# Patient Record
Sex: FEMALE | Race: WHITE | NOT HISPANIC OR LATINO | Employment: UNEMPLOYED | URBAN - METROPOLITAN AREA
[De-identification: names, ages, dates, MRNs, and addresses within clinical notes are randomized per-mention and may not be internally consistent; named-entity substitution may affect disease eponyms.]

---

## 2017-02-02 ENCOUNTER — ALLSCRIPTS OFFICE VISIT (OUTPATIENT)
Dept: OTHER | Facility: OTHER | Age: 32
End: 2017-02-02

## 2018-01-13 VITALS
WEIGHT: 153 LBS | HEIGHT: 61 IN | RESPIRATION RATE: 16 BRPM | TEMPERATURE: 98.4 F | DIASTOLIC BLOOD PRESSURE: 60 MMHG | SYSTOLIC BLOOD PRESSURE: 108 MMHG | BODY MASS INDEX: 28.89 KG/M2 | HEART RATE: 84 BPM

## 2018-01-14 NOTE — MISCELLANEOUS
Message  Return to work or school:   Chago Maloney is under my professional care  She was seen in my office on 1/25/16       Not to return to work, until fever free for 24 hours without antipyretics     Emil Sal MD       Signatures   Electronically signed by : CAROLINE Osman ; Jan 25 2016 12:52PM EST                       (Author)

## 2018-01-14 NOTE — PROGRESS NOTES
Assessment    1  Acute sinusitis (461 9) (J01 90)    Plan  Acute sinusitis    · Amoxicillin 500 MG Oral Tablet; 2po tid x 10 days   · Fluticasone Propionate 50 MCG/ACT Nasal Suspension; USE 1 SPRAY IN EACH  NOSTRIL ONCE DAILY    Discussion/Summary    #1 sinusitis  Discussed Dx, Tx - above meds  Discussed SE abx, such as diarrhea, C diff  Discussed probiotics, may lower risk of C diff  Continue 1wk after finished abx  Discussed use of nasal steroids, such as Flonase, Nasonex, Nasacort, Rhinocort, some now OTC  Advised to throw away, once started, not to keep them for the next episode, as bacteria can grow  Advised to review other SE on all meds, given from the pharmacy along c/ the prescription  Discussed ibuprofen/Tylenol for fever control, push fluids  Return if not better  prn  The patient was counseled regarding instructions for management  The treatment plan was reviewed with the patient/guardian  The patient/guardian understands and agrees with the treatment plan      Chief Complaint  Patient prersents with fever times 3 days  Patient is breast feeding 9 month old at home      History of Present Illness  HPI: 29y/o wf presents to office for fever x 3d's  Tmax 103F, did not take any meds today  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, one c/ bronchiolitis  Cold Symptoms:   Associated symptoms include nasal congestion, runny nose, dry cough, swollen lymph nodes, fatigue and fever, but no sore throat, no productive cough, no facial pressure, no facial pain, no plugged ear(s), no ear pain, no wheezing, no shortness of breath, no nausea, no vomiting and no diarrhea  Fever:   Associated symptoms include ear pain, cough and swollen neck glands  Active Problems    1  Anxiety (300 00) (F41 9)   2  Breast feeding status of mother (V24 1) (Z39 1)   3  Hyperlipidemia (272 4) (E78 5)   4  Need for prophylactic vaccination and inoculation against influenza (V04 81) (Z23)   5   Pain in wrist joint (719 43) (M25 539)   6  Pharyngitis (462) (J02 9)    Past Medical History    1  History of Acute maxillary sinusitis (461 0) (J01 00)   2  History of Attention Deficit Disorder Without Hyperactivity (314 00)   3  History of Cough (786 2) (R05)   4  History of Depression with anxiety (300 4) (F41 8)   5  History of Encounter for screening for respiratory tuberculosis (V74 1) (Z11 1)   6  History of headache (V13 89) (Z87 898)   7  History of ovarian cyst (V13 29) (Z87 42)   8  Need for prophylactic vaccination and inoculation against influenza (V04 81) (Z23)   9  History of Nonallopathic lesion of thoracic region (739 2) (M99 02)   10  History of Skin Abscess Of Arm (682 3)    Family History    1  Family history of Dyslipidemia   2  Family history of Hyperlipidemia   3  Family history of Hypertension (V17 49)    Social History    · Being A Social Drinker   · Marital History - Currently    · Never A Smoker    Current Meds   1  Escitalopram Oxalate 10 MG Oral Tablet; 1/2 tab po x3d then full tab every day; Therapy: 47FZC7848 to (Last Rx:16Jan2014)  Requested for: 21Jan2015 Ordered   2  Lidocaine Viscous 2 % Mouth/Throat Solution; USE 10ML EVERY 3 HOURS AS   NEEDED, swith and spit out; Therapy: 18QTK0462 to (Last Rx:13Fsz6430)  Requested for: 49ZTD6854 Ordered   3  Prenatal Vitamins TABS; Therapy: (Recorded:16Nov2015) to Recorded   4  Tylenol 325 MG Oral Tablet; TAKE 1 TO 2 TABLETS EVERY 4 HOURS AS NEEDED; Therapy: 71IKE8611 to (Evaluate:79Eub9122); Last Rx:59Sal0011 Ordered    Allergies    1  No Known Drug Allergies    Vitals   Recorded: 98MKC4777 10:51AM   Temperature 99 1 F   Heart Rate 84   Respiration 16   Systolic 337   Diastolic 84   Weight 900 lb    BMI Calculated 22 49   BSA Calculated 1 52     Physical Exam    Constitutional   General appearance: No acute distress, well appearing and well nourished      Head and Face   Palpation of the face and sinuses: Abnormal   Examination of the Sinuses: right maxillary tenderness, left maxillary tenderness, right frontal tenderness and left frontal tenderness, but non-tender right ethmoid and non-tender left ethmoid  Ears, Nose, Mouth, and Throat   Nasal mucosa, septum, and turbinates: Abnormal   There was clear rhinorrhea from both nares  The bilateral nasal mucosa was edematous and red, but not bleeding, not boggy, not crusted, not excoriated and not pale/blue  Oropharynx: Normal with no erythema, edema, exudate or lesions  Pulmonary   Respiratory effort: No increased work of breathing or signs of respiratory distress  Auscultation of lungs: Clear to auscultation  Cardiovascular   Auscultation of heart: Normal rate and rhythm, normal S1 and S2, no murmurs  Psychiatric   Judgment and insight: Normal     Orientation to person, place, and time: Normal     Recent and remote memory: Intact  Mood and affect: Normal        Message  Return to work or school:   Anthony Rowe is under my professional care  She was seen in my office on 1/25/16       Not to return to work, until fever free for 24 hours without antipyretics     Brina Mays MD       Signatures   Electronically signed by : CAROLINE Washburn ; Jan 25 2016 12:52PM EST                       (Author)

## 2018-03-29 ENCOUNTER — OFFICE VISIT (OUTPATIENT)
Dept: FAMILY MEDICINE CLINIC | Facility: CLINIC | Age: 33
End: 2018-03-29
Payer: COMMERCIAL

## 2018-03-29 VITALS
TEMPERATURE: 98.7 F | HEART RATE: 84 BPM | SYSTOLIC BLOOD PRESSURE: 120 MMHG | RESPIRATION RATE: 16 BRPM | DIASTOLIC BLOOD PRESSURE: 70 MMHG | WEIGHT: 129 LBS | BODY MASS INDEX: 23.74 KG/M2 | HEIGHT: 62 IN

## 2018-03-29 DIAGNOSIS — J02.9 VIRAL PHARYNGITIS: Primary | ICD-10-CM

## 2018-03-29 DIAGNOSIS — J02.9 SORE THROAT: ICD-10-CM

## 2018-03-29 LAB — S PYO AG THROAT QL: NEGATIVE

## 2018-03-29 PROCEDURE — 3008F BODY MASS INDEX DOCD: CPT | Performed by: NURSE PRACTITIONER

## 2018-03-29 PROCEDURE — 99213 OFFICE O/P EST LOW 20 MIN: CPT | Performed by: NURSE PRACTITIONER

## 2018-03-29 PROCEDURE — 87880 STREP A ASSAY W/OPTIC: CPT | Performed by: NURSE PRACTITIONER

## 2018-03-29 RX ORDER — ESCITALOPRAM OXALATE 10 MG/1
1 TABLET ORAL DAILY
COMMUNITY
Start: 2013-11-20 | End: 2018-04-18 | Stop reason: SDUPTHER

## 2018-03-29 RX ORDER — ACETAMINOPHEN AND CODEINE PHOSPHATE 120; 12 MG/5ML; MG/5ML
SOLUTION ORAL
COMMUNITY
Start: 2018-01-12 | End: 2020-03-04 | Stop reason: ALTCHOICE

## 2018-03-29 NOTE — PROGRESS NOTES
Subjective     Kenzie Molina is a 29 y o  female who presents for evaluation of sore throat  Associated symptoms include sore throat and headache  Onset of symptoms was 1 day ago, and have been gradually worsening since that time  She is drinking plenty of fluids  She has not had a recent close exposure to someone with proven streptococcal pharyngitis  The following portions of the patient's history were reviewed and updated as appropriate: allergies, current medications, past family history, past medical history, past social history, past surgical history and problem list     Review of Systems  Pertinent items are noted in HPI  Kenneth Rey No fever, no chills    No nasal congestion, cough, pnd, n/v/d   (+) headache  (+) sore throat  No sob  Objective   Constitutional: no fever, no distress, alert  HEENT: TMS WNL  Turbinates not inflamed  Oropharynx with mild erythema, no exudate  No sinus tenderness to palpation  (-) PND  Pulm: lungs clear, no wheeze, rhonchi, no increased resp effort  Cardiac: S1S2 rrr  Skin : warm and dry, no rash  Lymph: no adenopathy        Laboratory  Strep test done,   Results: negative    Assessment/Plan       1  Viral pharyngitis  - POCT rapid strepA  Rapid strep test was negative in the office  Salt water gargles  Lozenges  Tylenol or Ibuprofen as needed  Fluids

## 2018-04-18 DIAGNOSIS — F32.A ANXIETY AND DEPRESSION: Primary | ICD-10-CM

## 2018-04-18 DIAGNOSIS — F41.9 ANXIETY AND DEPRESSION: Primary | ICD-10-CM

## 2018-04-18 RX ORDER — ESCITALOPRAM OXALATE 10 MG/1
10 TABLET ORAL DAILY
Qty: 30 TABLET | Refills: 3 | Status: SHIPPED | OUTPATIENT
Start: 2018-04-18 | End: 2018-08-26 | Stop reason: SDUPTHER

## 2018-08-26 DIAGNOSIS — F32.A ANXIETY AND DEPRESSION: ICD-10-CM

## 2018-08-26 DIAGNOSIS — F41.9 ANXIETY AND DEPRESSION: ICD-10-CM

## 2018-08-26 RX ORDER — ESCITALOPRAM OXALATE 10 MG/1
10 TABLET ORAL DAILY
Qty: 30 TABLET | Refills: 3 | Status: SHIPPED | OUTPATIENT
Start: 2018-08-26 | End: 2018-10-17 | Stop reason: SDUPTHER

## 2018-08-27 NOTE — TELEPHONE ENCOUNTER
CALLED PT ADVISED SAME, PT SAID SHE WILL CALL BACK AND SCHED   APPT , SHE WASN'T PREPARED TO DO SO TODAY

## 2018-10-17 DIAGNOSIS — F41.9 ANXIETY AND DEPRESSION: ICD-10-CM

## 2018-10-17 DIAGNOSIS — F32.A ANXIETY AND DEPRESSION: ICD-10-CM

## 2018-10-18 RX ORDER — ESCITALOPRAM OXALATE 10 MG/1
10 TABLET ORAL DAILY
Qty: 30 TABLET | Refills: 0 | Status: SHIPPED | OUTPATIENT
Start: 2018-10-18 | End: 2018-12-30 | Stop reason: SDUPTHER

## 2018-12-30 DIAGNOSIS — F32.A ANXIETY AND DEPRESSION: ICD-10-CM

## 2018-12-30 DIAGNOSIS — F41.9 ANXIETY AND DEPRESSION: ICD-10-CM

## 2018-12-30 RX ORDER — ESCITALOPRAM OXALATE 10 MG/1
TABLET ORAL
Qty: 30 TABLET | Refills: 0 | Status: SHIPPED | OUTPATIENT
Start: 2018-12-30 | End: 2019-02-11 | Stop reason: SDUPTHER

## 2019-02-08 DIAGNOSIS — F32.A ANXIETY AND DEPRESSION: ICD-10-CM

## 2019-02-08 DIAGNOSIS — F41.9 ANXIETY AND DEPRESSION: ICD-10-CM

## 2019-02-08 RX ORDER — ESCITALOPRAM OXALATE 10 MG/1
TABLET ORAL
Qty: 30 TABLET | Refills: 0 | OUTPATIENT
Start: 2019-02-08

## 2019-02-11 ENCOUNTER — OFFICE VISIT (OUTPATIENT)
Dept: FAMILY MEDICINE CLINIC | Facility: CLINIC | Age: 34
End: 2019-02-11
Payer: COMMERCIAL

## 2019-02-11 VITALS
HEIGHT: 62 IN | TEMPERATURE: 98.2 F | WEIGHT: 132.2 LBS | RESPIRATION RATE: 14 BRPM | SYSTOLIC BLOOD PRESSURE: 90 MMHG | BODY MASS INDEX: 24.33 KG/M2 | HEART RATE: 58 BPM | DIASTOLIC BLOOD PRESSURE: 78 MMHG

## 2019-02-11 DIAGNOSIS — F32.A ANXIETY AND DEPRESSION: ICD-10-CM

## 2019-02-11 DIAGNOSIS — F41.9 ANXIETY AND DEPRESSION: ICD-10-CM

## 2019-02-11 DIAGNOSIS — Z00.00 PHYSICAL EXAM: ICD-10-CM

## 2019-02-11 PROCEDURE — 99213 OFFICE O/P EST LOW 20 MIN: CPT | Performed by: FAMILY MEDICINE

## 2019-02-11 PROCEDURE — 3008F BODY MASS INDEX DOCD: CPT | Performed by: FAMILY MEDICINE

## 2019-02-11 RX ORDER — ESCITALOPRAM OXALATE 10 MG/1
10 TABLET ORAL DAILY
Qty: 90 TABLET | Refills: 3 | Status: SHIPPED | OUTPATIENT
Start: 2019-02-11 | End: 2020-03-01

## 2019-02-11 NOTE — PROGRESS NOTES
Assessment/Plan:  Diagnoses and all orders for this visit:    Physical exam  Comments:  exam wnl  utd w gyn thru outside provider  eye/dental care utd  Anxiety and depression  Comments:  doing well on Lexapro--refill given,   Orders:  -     escitalopram (LEXAPRO) 10 mg tablet; Take 1 tablet (10 mg total) by mouth daily    Other orders  -     SPRINTEC 28 0 25-35 MG-MCG per tablet; Take 1 tablet by mouth daily             Subjective:      Patient ID: Juancho Seals is a 34 y o  female  Chief Complaint   Patient presents with    Anxiety     f/u refill on medication    Physical Exam       HPI     29-year-old patient in for physical exam and  follow-up on anxiety and depression  Continues to do well on the Lexapro  No acute complaints  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  Up-to-date with gyn and immunizations  Eye/dental care UTD  The following portions of the patient's history were reviewed and updated as appropriate: allergies, current medications, past family history, past medical history, past social history, past surgical history and problem list      Past Medical History:   Diagnosis Date    Attention deficit disorder without hyperactivity     Resolved 7/2/2015     Depression with anxiety     Resolved 7/2/2015     Nonallopathic lesion of thoracic region     Resolved 7/2/2015     Ovarian cyst     Resolved 7/2/2015      History reviewed  No pertinent surgical history       The patient has a family history of hyperlipidemia, htn, anxiety/depression      Social History     Socioeconomic History    Marital status: /Civil Union     Spouse name: Not on file    Number of children: Not on file    Years of education: Not on file    Highest education level: Not on file   Occupational History    Not on file   Social Needs    Financial resource strain: Not on file    Food insecurity:     Worry: Not on file     Inability: Not on file    Transportation needs:     Medical: Not on file     Non-medical: Not on file   Tobacco Use    Smoking status: Never Smoker    Smokeless tobacco: Never Used   Substance and Sexual Activity    Alcohol use: Yes     Comment: Social     Drug use: Never    Sexual activity: Yes   Lifestyle    Physical activity:     Days per week: Not on file     Minutes per session: Not on file    Stress: Not on file   Relationships    Social connections:     Talks on phone: Not on file     Gets together: Not on file     Attends Orthodoxy service: Not on file     Active member of club or organization: Not on file     Attends meetings of clubs or organizations: Not on file     Relationship status: Not on file    Intimate partner violence:     Fear of current or ex partner: Not on file     Emotionally abused: Not on file     Physically abused: Not on file     Forced sexual activity: Not on file   Other Topics Concern    Not on file   Social History Narrative    Not on file     Review of Systems   Constitutional: Positive for fatigue  Respiratory: Negative  Cardiovascular: Negative  Gastrointestinal: Negative  Musculoskeletal: Negative  Psychiatric/Behavioral: The patient is nervous/anxious  Objective:    BP 90/78 (BP Location: Left arm, Patient Position: Sitting, Cuff Size: Standard)   Pulse 58   Temp 98 2 °F (36 8 °C)   Resp 14   Ht 5' 2" (1 575 m)   Wt 60 kg (132 lb 3 2 oz)   LMP 02/04/2019   BMI 24 18 kg/m²        Physical Exam   Constitutional: She is oriented to person, place, and time  She appears well-developed and well-nourished  No distress  HENT:   Head: Normocephalic and atraumatic  Nose: Nose normal    Mouth/Throat: Oropharynx is clear and moist  No oropharyngeal exudate  Eyes: Pupils are equal, round, and reactive to light  Conjunctivae and EOM are normal  No scleral icterus  Neck: Normal range of motion  Neck supple  No thyromegaly present     Cardiovascular: Normal rate, regular rhythm, normal heart sounds and intact distal pulses  No murmur heard  Pulmonary/Chest: Effort normal and breath sounds normal  No respiratory distress  Abdominal: Soft  Bowel sounds are normal  There is no tenderness  Musculoskeletal: Normal range of motion  She exhibits no edema, tenderness or deformity  Neurological: She is alert and oriented to person, place, and time  No cranial nerve deficit  Skin: Skin is warm and dry  Capillary refill takes less than 2 seconds  No rash noted  Psychiatric: She has a normal mood and affect  Nursing note and vitals reviewed          Dandre Ring MD

## 2019-04-02 ENCOUNTER — TELEPHONE (OUTPATIENT)
Dept: FAMILY MEDICINE CLINIC | Facility: CLINIC | Age: 34
End: 2019-04-02

## 2019-08-12 ENCOUNTER — OFFICE VISIT (OUTPATIENT)
Dept: FAMILY MEDICINE CLINIC | Facility: CLINIC | Age: 34
End: 2019-08-12
Payer: COMMERCIAL

## 2019-08-12 VITALS
TEMPERATURE: 98.3 F | BODY MASS INDEX: 24.25 KG/M2 | HEIGHT: 62 IN | HEART RATE: 62 BPM | DIASTOLIC BLOOD PRESSURE: 70 MMHG | WEIGHT: 131.8 LBS | SYSTOLIC BLOOD PRESSURE: 122 MMHG | RESPIRATION RATE: 16 BRPM

## 2019-08-12 DIAGNOSIS — J02.9 SORE THROAT: Primary | ICD-10-CM

## 2019-08-12 DIAGNOSIS — T30.0 SUPERFICIAL BURN: ICD-10-CM

## 2019-08-12 PROCEDURE — 99213 OFFICE O/P EST LOW 20 MIN: CPT | Performed by: FAMILY MEDICINE

## 2019-08-12 PROCEDURE — 3008F BODY MASS INDEX DOCD: CPT | Performed by: FAMILY MEDICINE

## 2019-08-13 NOTE — PROGRESS NOTES
Assessment/Plan:     Diagnoses and all orders for this visit:    Sore throat  Likely viral, advised patient to have warm tea with honey and lemon to soothe the throat  Monitor for any fevers  If any worsening symptoms patient should return to the office for an evaluation  Patient agrees with plan  Superficial burn    Applied silver sulfide cream to the area  Advised to care with cool saline soaked gauze, pain management with tylenol if need  Clean with mild soap and water  If any blisters or worsening, patient should return to office  Subjective:      Patient ID: Jeni Rahman is a 30 y o  female  29 y/o female presents with sore throat for the past 1 week  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  She has not tried any medication  Denies any cough, fevers, congestion, or postnasal drip  She thinks her appetite was decreased due to the sore throat but was still able to eat  She also explains she has a superficial burn on her right lateral arm from the oven  No sloughing of skin or pain  She just wanted someone to look at it  She has not applied anything to it  The following portions of the patient's history were reviewed and updated as appropriate: allergies, current medications, past family history, past medical history, past social history, past surgical history and problem list     Review of Systems   Constitutional: Positive for appetite change  Negative for fever  HENT: Positive for sore throat  Negative for congestion, ear discharge, ear pain, sinus pressure, sinus pain, trouble swallowing and voice change  Eyes: Negative for redness and visual disturbance  Respiratory: Negative for cough and shortness of breath  Cardiovascular: Negative for chest pain and leg swelling  Gastrointestinal: Negative for abdominal pain, diarrhea, nausea and vomiting  Musculoskeletal: Negative for arthralgias  Skin: Positive for wound   Negative for color change  Neurological: Negative for dizziness, tremors, light-headedness and headaches  Psychiatric/Behavioral: Negative for agitation and behavioral problems  Objective:      /70 (BP Location: Left arm, Patient Position: Sitting, Cuff Size: Standard)   Pulse 62   Temp 98 3 °F (36 8 °C)   Resp 16   Ht 5' 2" (1 575 m)   Wt 59 8 kg (131 lb 12 8 oz)   BMI 24 11 kg/m²          Physical Exam   Constitutional: She is oriented to person, place, and time  She appears well-developed and well-nourished  No distress  HENT:   Head: Normocephalic and atraumatic  Right Ear: Tympanic membrane and external ear normal  No drainage  Left Ear: Tympanic membrane and external ear normal  No drainage  Nose: Nose normal    Mouth/Throat: Oropharynx is clear and moist and mucous membranes are normal  No oropharyngeal exudate  Eyes: EOM are normal  Right eye exhibits no discharge  Left eye exhibits no discharge  Neck: Normal range of motion  Neck supple  Cardiovascular: Normal rate, regular rhythm, normal heart sounds and intact distal pulses  No murmur heard  Pulmonary/Chest: Effort normal and breath sounds normal  No respiratory distress  Abdominal: Soft  Bowel sounds are normal  She exhibits no distension  There is no tenderness  Musculoskeletal: Normal range of motion  She exhibits no edema  Neurological: She is alert and oriented to person, place, and time  Skin: Skin is warm  2 Cm X 1 5 cm erythema burn claudia noted on right lateral arm  No sloughing of skin or blisters noted  Psychiatric: She has a normal mood and affect   Her behavior is normal

## 2020-02-29 DIAGNOSIS — F41.9 ANXIETY AND DEPRESSION: ICD-10-CM

## 2020-02-29 DIAGNOSIS — F32.A ANXIETY AND DEPRESSION: ICD-10-CM

## 2020-03-01 RX ORDER — ESCITALOPRAM OXALATE 10 MG/1
TABLET ORAL
Qty: 90 TABLET | Refills: 1 | Status: SHIPPED | OUTPATIENT
Start: 2020-03-01 | End: 2020-08-07 | Stop reason: SDUPTHER

## 2020-03-04 ENCOUNTER — OFFICE VISIT (OUTPATIENT)
Dept: FAMILY MEDICINE CLINIC | Facility: CLINIC | Age: 35
End: 2020-03-04
Payer: COMMERCIAL

## 2020-03-04 ENCOUNTER — TELEPHONE (OUTPATIENT)
Dept: ADMINISTRATIVE | Facility: OTHER | Age: 35
End: 2020-03-04

## 2020-03-04 VITALS
HEIGHT: 62 IN | SYSTOLIC BLOOD PRESSURE: 102 MMHG | WEIGHT: 128.4 LBS | RESPIRATION RATE: 14 BRPM | BODY MASS INDEX: 23.63 KG/M2 | TEMPERATURE: 98.1 F | HEART RATE: 78 BPM | DIASTOLIC BLOOD PRESSURE: 78 MMHG

## 2020-03-04 DIAGNOSIS — L50.9 HIVES: Primary | ICD-10-CM

## 2020-03-04 LAB — S PYO AG THROAT QL: NEGATIVE

## 2020-03-04 PROCEDURE — 87880 STREP A ASSAY W/OPTIC: CPT | Performed by: FAMILY MEDICINE

## 2020-03-04 PROCEDURE — 3008F BODY MASS INDEX DOCD: CPT | Performed by: FAMILY MEDICINE

## 2020-03-04 PROCEDURE — 99213 OFFICE O/P EST LOW 20 MIN: CPT | Performed by: FAMILY MEDICINE

## 2020-03-04 PROCEDURE — 1036F TOBACCO NON-USER: CPT | Performed by: FAMILY MEDICINE

## 2020-03-04 RX ORDER — METHYLPREDNISOLONE 4 MG/1
TABLET ORAL
Qty: 21 TABLET | Refills: 0 | Status: SHIPPED | OUTPATIENT
Start: 2020-03-04 | End: 2021-10-22 | Stop reason: ALTCHOICE

## 2020-03-06 LAB
ALBUMIN SERPL-MCNC: 4.3 G/DL (ref 3.6–5.1)
ALBUMIN/GLOB SERPL: 1.5 (CALC) (ref 1–2.5)
ALP SERPL-CCNC: 53 U/L (ref 31–125)
ALT SERPL-CCNC: 17 U/L (ref 6–29)
AST SERPL-CCNC: 23 U/L (ref 10–30)
BASOPHILS # BLD AUTO: 60 CELLS/UL (ref 0–200)
BASOPHILS NFR BLD AUTO: 1 %
BILIRUB SERPL-MCNC: 0.6 MG/DL (ref 0.2–1.2)
BUN SERPL-MCNC: 18 MG/DL (ref 7–25)
BUN/CREAT SERPL: NORMAL (CALC) (ref 6–22)
CALCIUM SERPL-MCNC: 9.9 MG/DL (ref 8.6–10.2)
CHLORIDE SERPL-SCNC: 104 MMOL/L (ref 98–110)
CO2 SERPL-SCNC: 27 MMOL/L (ref 20–32)
CREAT SERPL-MCNC: 0.7 MG/DL (ref 0.5–1.1)
EOSINOPHIL # BLD AUTO: 168 CELLS/UL (ref 15–500)
EOSINOPHIL NFR BLD AUTO: 2.8 %
ERYTHROCYTE [DISTWIDTH] IN BLOOD BY AUTOMATED COUNT: 12.4 % (ref 11–15)
GLOBULIN SER CALC-MCNC: 2.9 G/DL (CALC) (ref 1.9–3.7)
GLUCOSE SERPL-MCNC: 78 MG/DL (ref 65–99)
HCT VFR BLD AUTO: 38 % (ref 35–45)
HGB BLD-MCNC: 12.3 G/DL (ref 11.7–15.5)
LYMPHOCYTES # BLD AUTO: 2808 CELLS/UL (ref 850–3900)
LYMPHOCYTES NFR BLD AUTO: 46.8 %
MCH RBC QN AUTO: 29.8 PG (ref 27–33)
MCHC RBC AUTO-ENTMCNC: 32.4 G/DL (ref 32–36)
MCV RBC AUTO: 92 FL (ref 80–100)
MONOCYTES # BLD AUTO: 366 CELLS/UL (ref 200–950)
MONOCYTES NFR BLD AUTO: 6.1 %
NEUTROPHILS # BLD AUTO: 2598 CELLS/UL (ref 1500–7800)
NEUTROPHILS NFR BLD AUTO: 43.3 %
PLATELET # BLD AUTO: 304 THOUSAND/UL (ref 140–400)
PMV BLD REES-ECKER: 10.6 FL (ref 7.5–12.5)
POTASSIUM SERPL-SCNC: 4.6 MMOL/L (ref 3.5–5.3)
PROT SERPL-MCNC: 7.2 G/DL (ref 6.1–8.1)
RBC # BLD AUTO: 4.13 MILLION/UL (ref 3.8–5.1)
SL AMB EGFR AFRICAN AMERICAN: 133 ML/MIN/1.73M2
SL AMB EGFR NON AFRICAN AMERICAN: 115 ML/MIN/1.73M2
SODIUM SERPL-SCNC: 136 MMOL/L (ref 135–146)
TSH SERPL-ACNC: 1.99 MIU/L
WBC # BLD AUTO: 6 THOUSAND/UL (ref 3.8–10.8)

## 2020-03-09 LAB — B-HEM STREP SPEC QL CULT: NEGATIVE

## 2020-03-20 ENCOUNTER — TELEPHONE (OUTPATIENT)
Dept: ADMINISTRATIVE | Facility: OTHER | Age: 35
End: 2020-03-20

## 2020-03-23 ENCOUNTER — TELEPHONE (OUTPATIENT)
Dept: ADMINISTRATIVE | Facility: OTHER | Age: 35
End: 2020-03-23

## 2020-05-21 PROBLEM — L50.9 HIVES: Status: ACTIVE | Noted: 2020-05-21

## 2020-08-07 DIAGNOSIS — F32.A ANXIETY AND DEPRESSION: ICD-10-CM

## 2020-08-07 DIAGNOSIS — F41.9 ANXIETY AND DEPRESSION: ICD-10-CM

## 2020-08-08 RX ORDER — ESCITALOPRAM OXALATE 10 MG/1
10 TABLET ORAL DAILY
Qty: 90 TABLET | Refills: 1 | Status: SHIPPED | OUTPATIENT
Start: 2020-08-08 | End: 2021-01-26

## 2020-08-12 ENCOUNTER — TELEPHONE (OUTPATIENT)
Dept: FAMILY MEDICINE CLINIC | Facility: CLINIC | Age: 35
End: 2020-08-12

## 2021-01-25 DIAGNOSIS — F32.A ANXIETY AND DEPRESSION: ICD-10-CM

## 2021-01-25 DIAGNOSIS — F41.9 ANXIETY AND DEPRESSION: ICD-10-CM

## 2021-01-26 RX ORDER — ESCITALOPRAM OXALATE 10 MG/1
TABLET ORAL
Qty: 90 TABLET | Refills: 1 | Status: SHIPPED | OUTPATIENT
Start: 2021-01-26 | End: 2021-07-24

## 2021-10-22 ENCOUNTER — TELEMEDICINE (OUTPATIENT)
Dept: FAMILY MEDICINE CLINIC | Facility: CLINIC | Age: 36
End: 2021-10-22
Payer: COMMERCIAL

## 2021-10-22 VITALS — TEMPERATURE: 98.5 F | WEIGHT: 140 LBS | HEIGHT: 63 IN | BODY MASS INDEX: 24.8 KG/M2

## 2021-10-22 DIAGNOSIS — F41.9 ANXIETY AND DEPRESSION: Primary | ICD-10-CM

## 2021-10-22 DIAGNOSIS — F32.A ANXIETY AND DEPRESSION: Primary | ICD-10-CM

## 2021-10-22 PROBLEM — Z86.59 HISTORY OF ANXIETY: Status: ACTIVE | Noted: 2021-10-04

## 2021-10-22 PROBLEM — Z37.9 NORMAL LABOR: Status: ACTIVE | Noted: 2021-10-03

## 2021-10-22 PROCEDURE — 99213 OFFICE O/P EST LOW 20 MIN: CPT | Performed by: FAMILY MEDICINE

## 2021-10-22 RX ORDER — SERTRALINE HYDROCHLORIDE 25 MG/1
25 TABLET, FILM COATED ORAL DAILY
Qty: 30 TABLET | Refills: 2 | Status: SHIPPED | OUTPATIENT
Start: 2021-10-22 | End: 2021-11-09 | Stop reason: ALTCHOICE

## 2021-10-25 ENCOUNTER — TELEPHONE (OUTPATIENT)
Dept: ADMINISTRATIVE | Facility: OTHER | Age: 36
End: 2021-10-25

## 2021-11-09 ENCOUNTER — TELEPHONE (OUTPATIENT)
Dept: FAMILY MEDICINE CLINIC | Facility: CLINIC | Age: 36
End: 2021-11-09

## 2022-02-05 DIAGNOSIS — F32.A ANXIETY AND DEPRESSION: ICD-10-CM

## 2022-02-05 DIAGNOSIS — F41.9 ANXIETY AND DEPRESSION: ICD-10-CM

## 2022-02-05 RX ORDER — ESCITALOPRAM OXALATE 10 MG/1
TABLET ORAL
Qty: 90 TABLET | Refills: 1 | Status: SHIPPED | OUTPATIENT
Start: 2022-02-05

## 2022-09-26 DIAGNOSIS — F41.9 ANXIETY AND DEPRESSION: ICD-10-CM

## 2022-09-26 DIAGNOSIS — F32.A ANXIETY AND DEPRESSION: ICD-10-CM

## 2022-09-26 RX ORDER — ESCITALOPRAM OXALATE 10 MG/1
TABLET ORAL
Qty: 90 TABLET | Refills: 1 | Status: SHIPPED | OUTPATIENT
Start: 2022-09-26

## 2023-07-28 DIAGNOSIS — F32.A ANXIETY AND DEPRESSION: ICD-10-CM

## 2023-07-28 DIAGNOSIS — F41.9 ANXIETY AND DEPRESSION: ICD-10-CM

## 2023-07-28 RX ORDER — ESCITALOPRAM OXALATE 10 MG/1
TABLET ORAL
Qty: 90 TABLET | Refills: 0 | Status: SHIPPED | OUTPATIENT
Start: 2023-07-28

## 2023-07-28 NOTE — TELEPHONE ENCOUNTER
Rx sent but pt overdue for appt--please schedule--can be virtual if pt cannot make it in to office-thanks

## 2023-11-19 DIAGNOSIS — F41.9 ANXIETY AND DEPRESSION: ICD-10-CM

## 2023-11-19 DIAGNOSIS — F32.A ANXIETY AND DEPRESSION: ICD-10-CM

## 2023-11-21 RX ORDER — ESCITALOPRAM OXALATE 10 MG/1
TABLET ORAL
Qty: 90 TABLET | Refills: 0 | Status: SHIPPED | OUTPATIENT
Start: 2023-11-21

## 2024-02-27 DIAGNOSIS — F41.9 ANXIETY AND DEPRESSION: ICD-10-CM

## 2024-02-27 DIAGNOSIS — F32.A ANXIETY AND DEPRESSION: ICD-10-CM

## 2024-02-27 NOTE — TELEPHONE ENCOUNTER
Called patient. She moved to [facility] and has not yet found new Dr. Scheduled virtual appointment 3/1.

## 2024-02-28 NOTE — TELEPHONE ENCOUNTER
Does she have enough tablets left to get to appt? If not which pharmacy to send to as request says [facility]

## 2024-02-28 NOTE — TELEPHONE ENCOUNTER
Pt called in stating she has enough meds to last her until her appt, but she uses [facility] on [address] in [place], [place].

## 2024-02-29 DIAGNOSIS — F41.9 ANXIETY AND DEPRESSION: ICD-10-CM

## 2024-02-29 DIAGNOSIS — F32.A ANXIETY AND DEPRESSION: ICD-10-CM

## 2024-02-29 RX ORDER — ESCITALOPRAM OXALATE 10 MG/1
10 TABLET ORAL DAILY
Qty: 90 TABLET | Refills: 0 | Status: SHIPPED | OUTPATIENT
Start: 2024-02-29

## 2024-02-29 RX ORDER — ESCITALOPRAM OXALATE 10 MG/1
10 TABLET ORAL DAILY
Qty: 90 TABLET | Refills: 0 | OUTPATIENT
Start: 2024-02-29

## 2024-02-29 RX ORDER — ESCITALOPRAM OXALATE 10 MG/1
TABLET ORAL
Qty: 90 TABLET | Refills: 0 | OUTPATIENT
Start: 2024-02-29

## 2024-02-29 NOTE — TELEPHONE ENCOUNTER
E-Prescribing Status: Receipt confirmed by pharmacy (2/29/2024 11:39 AM EST)   Different pharm but chart message from 02.28.2024 shows patient requesting from [facility]

## 2024-03-01 ENCOUNTER — TELEMEDICINE (OUTPATIENT)
Dept: FAMILY MEDICINE CLINIC | Facility: CLINIC | Age: 39
End: 2024-03-01
Payer: COMMERCIAL

## 2024-03-01 DIAGNOSIS — F41.9 ANXIETY AND DEPRESSION: Primary | ICD-10-CM

## 2024-03-01 DIAGNOSIS — F32.A ANXIETY AND DEPRESSION: Primary | ICD-10-CM

## 2024-03-01 PROCEDURE — 99213 OFFICE O/P EST LOW 20 MIN: CPT | Performed by: FAMILY MEDICINE

## 2024-03-01 NOTE — PROGRESS NOTES
Virtual Regular Visit    Verification of patient location:    Patient is located at {[facility] Virtual Patient Location:[address]} in the following state in which I hold an active license {[facility] virtual patient location:[address]35}      Assessment/Plan:    Problem List Items Addressed This Visit    None           Reason for visit is   Chief Complaint   Patient presents with   • Virtual Regular Visit          Encounter provider Su Bhardwaj MD    Provider located at [facility]  [address] [facility] [address]      Recent Visits  No visits were found meeting these conditions.  Showing recent visits within past 7 days and meeting all other requirements  Today's Visits  Date Type Provider Dept   03/01/24 Telemedicine Su Bhardwaj MD [facility]   Showing today's visits and meeting all other requirements  Future Appointments  No visits were found meeting these conditions.  Showing future appointments within next 150 days and meeting all other requirements       The patient was identified by name and date of birth. Shirlene Huston was informed that this is a telemedicine visit and that the visit is being conducted through {[facility] VIRTUAL VISIT MEDIUM:86840}.  {Telemedicine confidentiality :28504} {Telemedicine participants:22520}  She acknowledged consent and understanding of privacy and security of the video platform. The patient has agreed to participate and understands they can discontinue the visit at any time.    Patient is aware this is a billable service.     Subjective  Shirlene Huston is a 38 y.o. female *** .      HPI     Past Medical History:   Diagnosis Date   • Attention deficit disorder without hyperactivity     Resolved 7/2/2015    • Depression with anxiety     Resolved 7/2/2015    • Nonallopathic lesion of thoracic region     Resolved 7/2/2015    • Ovarian cyst     Resolved 7/2/2015        No past surgical history on file.    Current Outpatient  Medications   Medication Sig Dispense Refill   • escitalopram (LEXAPRO) 10 mg tablet Take 1 tablet (10 mg total) by mouth daily 90 tablet 0     No current facility-administered medications for this visit.        No Known Allergies    Review of Systems    Video Exam    There were no vitals filed for this visit.    Physical Exam     Visit Time  Total Visit Duration: ***

## 2024-03-10 NOTE — PROGRESS NOTES
Virtual Regular Visit    Verification of patient location:    Patient is located at Home in the following state in which I hold an active license [place]      Assessment/Plan:    Problem List Items Addressed This Visit     Anxiety and depression - Primary   Stable on SSRI  Rx Lexapro 10mg daily       Reason for visit is   Chief Complaint   Patient presents with   • Virtual Regular Visit   • Virtual Regular Visit          Encounter provider Su Bhardwaj MD    Provider located at [facility]  [address] [facility] [address]      Recent Visits  No visits were found meeting these conditions.  Showing recent visits within past 7 days and meeting all other requirements  Future Appointments  No visits were found meeting these conditions.  Showing future appointments within next 150 days and meeting all other requirements       The patient was identified by name and date of birth. Shirlene Huston was informed that this is a telemedicine visit and that the visit is being conducted through the [email or web address] platform. She agrees to proceed..  My office door was closed. No one else was in the room.  She acknowledged consent and understanding of privacy and security of the video platform. The patient has agreed to participate and understands they can discontinue the visit at any time.    Patient is aware this is a billable service.     Subjective  Shirlene Huston is a 38 y.o. female  .      HPI     Follow-up on anxiety/depression  Mood stable on SSRI  No side effects from lexapro use  Has moved to [place]  Working and caring for her 3 children--Maddi Dumont and Britney    Past Medical History:   Diagnosis Date   • Attention deficit disorder without hyperactivity     Resolved 7/2/2015    • Depression with anxiety     Resolved 7/2/2015    • Nonallopathic lesion of thoracic region     Resolved 7/2/2015    • Ovarian cyst     Resolved 7/2/2015        History reviewed. No pertinent  surgical history.    Current Outpatient Medications   Medication Sig Dispense Refill   • escitalopram (LEXAPRO) 10 mg tablet Take 1 tablet (10 mg total) by mouth daily 90 tablet 0     No current facility-administered medications for this visit.        No Known Allergies    Review of Systems   Constitutional:  Positive for fatigue. Negative for fever.   HENT:  Negative for facial swelling, rhinorrhea and trouble swallowing.    Respiratory:  Negative for shortness of breath and wheezing.    Cardiovascular:  Negative for chest pain.   Gastrointestinal:  Negative for vomiting.   Psychiatric/Behavioral:  Positive for sleep disturbance. The patient is nervous/anxious.        Video Exam    Physical Exam   AAOX3. NAD. Speech clear -answering questions appropriately.    Visit Time  Total Visit Duration: 15 min

## 2024-08-25 DIAGNOSIS — F32.A ANXIETY AND DEPRESSION: ICD-10-CM

## 2024-08-25 DIAGNOSIS — F41.9 ANXIETY AND DEPRESSION: ICD-10-CM

## 2024-08-26 RX ORDER — ESCITALOPRAM OXALATE 10 MG/1
10 TABLET ORAL DAILY
Qty: 90 TABLET | Refills: 0 | Status: SHIPPED | OUTPATIENT
Start: 2024-08-26

## 2025-01-06 DIAGNOSIS — F41.9 ANXIETY AND DEPRESSION: ICD-10-CM

## 2025-01-06 DIAGNOSIS — F32.A ANXIETY AND DEPRESSION: ICD-10-CM

## 2025-01-07 RX ORDER — ESCITALOPRAM OXALATE 10 MG/1
10 TABLET ORAL DAILY
Qty: 90 TABLET | Refills: 0 | Status: SHIPPED | OUTPATIENT
Start: 2025-01-07